# Patient Record
Sex: FEMALE | Race: AMERICAN INDIAN OR ALASKA NATIVE
[De-identification: names, ages, dates, MRNs, and addresses within clinical notes are randomized per-mention and may not be internally consistent; named-entity substitution may affect disease eponyms.]

---

## 2019-11-29 ENCOUNTER — HOSPITAL ENCOUNTER (EMERGENCY)
Dept: HOSPITAL 5 - ED | Age: 29
LOS: 1 days | Discharge: HOME | End: 2019-11-30
Payer: MEDICAID

## 2019-11-29 VITALS — SYSTOLIC BLOOD PRESSURE: 125 MMHG | DIASTOLIC BLOOD PRESSURE: 83 MMHG

## 2019-11-29 DIAGNOSIS — N39.0: Primary | ICD-10-CM

## 2019-11-29 DIAGNOSIS — Z79.01: ICD-10-CM

## 2019-11-29 DIAGNOSIS — Z88.8: ICD-10-CM

## 2019-11-29 DIAGNOSIS — Z86.711: ICD-10-CM

## 2019-11-29 DIAGNOSIS — Z79.899: ICD-10-CM

## 2019-11-29 DIAGNOSIS — Z87.891: ICD-10-CM

## 2019-11-29 DIAGNOSIS — J45.909: ICD-10-CM

## 2019-11-29 LAB
ALBUMIN SERPL-MCNC: 4.6 G/DL (ref 3.9–5)
ALT SERPL-CCNC: 9 UNITS/L (ref 7–56)
BACTERIA #/AREA URNS HPF: (no result) /HPF
BILIRUB UR QL STRIP: (no result)
BLOOD UR QL VISUAL: (no result)
BUN SERPL-MCNC: 11 MG/DL (ref 7–17)
BUN/CREAT SERPL: 16 %
CALCIUM SERPL-MCNC: 9.5 MG/DL (ref 8.4–10.2)
HCT VFR BLD CALC: 43.2 % (ref 30.3–42.9)
HEMOLYSIS INDEX: 25
HGB BLD-MCNC: 14.5 GM/DL (ref 10.1–14.3)
MCHC RBC AUTO-ENTMCNC: 34 % (ref 30–34)
MCV RBC AUTO: 97 FL (ref 79–97)
MUCOUS THREADS #/AREA URNS HPF: (no result) /HPF
PH UR STRIP: 6 [PH] (ref 5–7)
PLATELET # BLD: 223 K/MM3 (ref 140–440)
RBC # BLD AUTO: 4.47 M/MM3 (ref 3.65–5.03)
RBC #/AREA URNS HPF: > 182 /HPF (ref 0–6)
UROBILINOGEN UR-MCNC: < 2 MG/DL (ref ?–2)
WBC #/AREA URNS HPF: > 182 /HPF (ref 0–6)

## 2019-11-29 PROCEDURE — 96372 THER/PROPH/DIAG INJ SC/IM: CPT

## 2019-11-29 PROCEDURE — 96374 THER/PROPH/DIAG INJ IV PUSH: CPT

## 2019-11-29 PROCEDURE — 80307 DRUG TEST PRSMV CHEM ANLYZR: CPT

## 2019-11-29 PROCEDURE — 74177 CT ABD & PELVIS W/CONTRAST: CPT

## 2019-11-29 PROCEDURE — 81001 URINALYSIS AUTO W/SCOPE: CPT

## 2019-11-29 PROCEDURE — 85025 COMPLETE CBC W/AUTO DIFF WBC: CPT

## 2019-11-29 PROCEDURE — 99284 EMERGENCY DEPT VISIT MOD MDM: CPT

## 2019-11-29 PROCEDURE — 96376 TX/PRO/DX INJ SAME DRUG ADON: CPT

## 2019-11-29 PROCEDURE — 96361 HYDRATE IV INFUSION ADD-ON: CPT

## 2019-11-29 PROCEDURE — 81025 URINE PREGNANCY TEST: CPT

## 2019-11-29 PROCEDURE — 36415 COLL VENOUS BLD VENIPUNCTURE: CPT

## 2019-11-29 PROCEDURE — 80053 COMPREHEN METABOLIC PANEL: CPT

## 2019-11-29 PROCEDURE — 96375 TX/PRO/DX INJ NEW DRUG ADDON: CPT

## 2019-11-29 NOTE — EVENT NOTE
ED Screening Note


Date of service: 11/29/19


ED Screening Note: 








This initial assessment/diagnostic orders/clinical plan/treatment(s) is/are 

subject to change based on patients health status, clinical progression and re-

assessment by fellow clinical providers in the ED. Further treatment and workup 

at subsequent clinical providers discretion. Patient/guardian urged not to elope

from the ED as their condition may be serious if not clinically assessed and 

managed. 





Initial orders include: 


28 yo BF states that she has abdominal pain x 1 day.

## 2019-11-29 NOTE — EMERGENCY DEPARTMENT REPORT
<DONALDO MOTT - Last Filed: 11/29/19 23:01>





ED Abdominal Pain HPI





- General


Chief Complaint: Abdominal Pain


Stated Complaint: ABD PAIN


Time Seen by Provider: 11/29/19 19:58


Source: patient


Mode of arrival: Wheelchair


Limitations: No Limitations





- History of Present Illness


Initial Comments: 





Patient is a 29-year-old  female who is presenting with diffuse 

abdominal pain nausea vomiting.  Patient has a history of diverticulitis.  

Patient states that her abdomen started hurting this morning and is diffuse 10 

out 10 pain.  The patient is a poor historian secondary to the pain she is 

having a hard time talking.  She denies dysuria or fevers chills, cold or 

congestion.


Severity scale (0 -10): 10





- Related Data


                                  Previous Rx's











 Medication  Instructions  Recorded  Last Taken  Type


 


Dicyclomine [Bentyl] 20 mg PO QID PRN #20 tablet 04/20/19 Unknown Rx


 


Promethazine [Phenergan TAB] 25 mg PO Q6HR PRN #20 tab 04/20/19 Unknown Rx


 


Promethazine HCl [Promethazine TAB] 12.5 mg PO [id number] 2 Days #8 tab 11/30/19 

Unknown Rx


 


cephALEXin [Keflex] 500 mg PO Q6HR 5 Days #20 capsule 11/30/19 Unknown Rx











                                    Allergies











Allergy/AdvReac Type Severity Reaction Status Date / Time


 


famotidine [From Pepcid] Allergy  Unknown Verified 04/20/19 22:09


 


hydromorphone [From Dilaudid] Allergy  Unknown Verified 04/20/19 22:09














ED Review of Systems


Comment: All other systems reviewed and negative





ED Past Medical Hx





- Past Medical History


Previous Medical History?: Yes


Hx Deep Vein Thrombosis: Yes


Hx Asthma: Yes


Additional medical history: MS.  Diverticulitis





- Surgical History


Past Surgical History?: Yes


Additional Surgical History: D&C, L knee





- Social History


Smoking Status: Former Smoker





- Medications


Home Medications: 


                                Home Medications











 Medication  Instructions  Recorded  Confirmed  Last Taken  Type


 


Dicyclomine [Bentyl] 20 mg PO QID PRN #20 tablet 04/20/19  Unknown Rx


 


Promethazine [Phenergan TAB] 25 mg PO Q6HR PRN #20 tab 04/20/19  Unknown Rx


 


Promethazine HCl [Promethazine TAB] 12.5 mg PO [id number] 2 Days #8 tab 11/30/19  

Unknown Rx


 


cephALEXin [Keflex] 500 mg PO Q6HR 5 Days #20 capsule 11/30/19  Unknown Rx














ED Physical Exam





- General


Limitations: No Limitations


General appearance: alert, anxious, in distress





- Head


Head exam: Present: atraumatic, normocephalic





- Eye


Eye exam: Present: normal appearance, PERRL, EOMI





- ENT


ENT exam: Present: normal orophraynx, mucous membranes moist





- Neck


Neck exam: Present: normal inspection





- Respiratory


Respiratory exam: Present: normal lung sounds bilaterally.  Absent: respiratory 

distress, wheezes, rales, rhonchi





- Cardiovascular


Cardiovascular Exam: Present: regular rate, normal rhythm, normal heart sounds. 

 Absent: systolic murmur, diastolic murmur, rubs, gallop





- GI/Abdominal


GI/Abdominal exam: Present: soft, tenderness, normal bowel sounds.  Absent: 

distended, guarding, rebound, rigid





- Extremities Exam


Extremities exam: Present: normal inspection





- Back Exam


Back exam: Present: normal inspection





- Neurological Exam


Neurological exam: Present: alert, oriented X3





- Psychiatric


Psychiatric exam: Present: normal affect, normal mood





- Skin


Skin exam: Present: warm, dry, intact, normal color.  Absent: rash





ED Medical Decision Making





- Lab Data


Result diagrams: 


                                 11/29/19 19:08





                                 11/29/19 19:11








                                   Lab Results











  11/29/19 11/29/19 Range/Units





  19:08 19:11 


 


WBC  9.6   (4.5-11.0)  K/mm3


 


RBC  4.47   (3.65-5.03)  M/mm3


 


Hgb  14.5 H   (10.1-14.3)  gm/dl


 


Hct  43.2 H   (30.3-42.9)  %


 


MCV  97   (79-97)  fl


 


MCH  32   (28-32)  pg


 


MCHC  34   (30-34)  %


 


RDW  13.8   (13.2-15.2)  %


 


Plt Count  223   (140-440)  K/mm3


 


Lymph % (Auto)  Np   


 


Mono % (Auto)  Np   


 


Eos % (Auto)  Np   


 


Baso % (Auto)  Np   


 


Lymph #  Np   


 


Mono #  Np   


 


Eos #  Np   


 


Baso #  Np   


 


Seg Neutrophils %  Np   


 


Seg Neutrophils #  Np   


 


Sodium   138  (137-145)  mmol/L


 


Potassium   3.5 L  (3.6-5.0)  mmol/L


 


Chloride   101.0  ()  mmol/L


 


Carbon Dioxide   18 L  (22-30)  mmol/L


 


Anion Gap   23  mmol/L


 


BUN   11  (7-17)  mg/dL


 


Creatinine   0.7  (0.7-1.2)  mg/dL


 


Estimated GFR   > 60  ml/min


 


BUN/Creatinine Ratio   16  %


 


Glucose   122 H  ()  mg/dL


 


Calcium   9.5  (8.4-10.2)  mg/dL


 


Total Bilirubin   0.40  (0.1-1.2)  mg/dL


 


AST   16  (5-40)  units/L


 


ALT   9  (7-56)  units/L


 


Alkaline Phosphatase   39  ()  units/L


 


Total Protein   7.8  (6.3-8.2)  g/dL


 


Albumin   4.6  (3.9-5)  g/dL


 


Albumin/Globulin Ratio   1.4  %














ED Disposition


Clinical Impression: 


 Abdominal pain, Urinary tract infection





Disposition: DC-01 TO HOME OR SELFCARE


Condition: Stable


Instructions:  Abdominal Pain (ED), Urinary Tract Infection in Women (ED)


Prescriptions: 


Promethazine HCl [Promethazine TAB] 12.5 mg PO [id number] 2 Days #8 tab


cephALEXin [Keflex] 500 mg PO Q6HR 5 Days #20 capsule


Referrals: 


[facility] [Outside] - 3-5 Days





<DU MOTT - Last Filed: 11/30/19 00:34>





ED Review of Systems


ROS: 


Stated complaint: ABD PAIN


Other details as noted in HPI








ED Course





                                   Vital Signs











  11/29/19 11/29/19 11/29/19





  18:14 18:19 20:00


 


Temperature  97.5 F L 


 


Pulse Rate  57 L 74


 


Respiratory 18 20 27 H





Rate   


 


Blood Pressure  121/94 


 


O2 Sat by Pulse 99 99 





Oximetry   














  11/29/19 11/29/19 11/29/19





  20:16 20:30 20:46


 


Temperature   


 


Pulse Rate 53 L 57 L 55 L


 


Respiratory 56 H 18 19





Rate   


 


Blood Pressure 126/65 126/65 105/54


 


O2 Sat by Pulse 100 100 100





Oximetry   














  11/29/19 11/29/19 11/29/19





  21:00 21:15 21:30


 


Temperature   


 


Pulse Rate 46 L 44 L 47 L


 


Respiratory 18 14 12





Rate   


 


Blood Pressure 105/54 87/66 115/69


 


O2 Sat by Pulse 100 99 100





Oximetry   














  11/29/19 11/29/19 11/29/19





  21:45 22:00 22:15


 


Temperature   


 


Pulse Rate 47 L 50 L 56 L


 


Respiratory 17 16 10 L





Rate   


 


Blood Pressure 122/68 122/68 125/83


 


O2 Sat by Pulse 100  100





Oximetry   














  11/29/19





  22:38


 


Temperature 


 


Pulse Rate 


 


Respiratory 





Rate 


 


Blood Pressure 


 


O2 Sat by Pulse 100





Oximetry 














ED Medical Decision Making





- Lab Data


Result diagrams: 


                                 11/29/19 19:08





                                 11/29/19 19:11





- Radiology Data


Radiology results: report reviewed





CT abdomen and pelvis: No acute findings according to radiologist's impression





- Medical Decision Making





I evaluated Mrs. Crawley.  She has nausea and abdominal discomfort.  She is 

currently laying on her abdomen moving side to side.  She told me that she was 

encouraged to stop using marijuana due to recurrent symptoms of "gastritis".  

She was able to give a full history.





Impression diagnosis includes IBS, cannabis hyperemesis syndrome.





Urinalysis positive for UTI.


I reviewed labs CBC within normal limits.  No leukocytosis.  No anemia.  No 

electrolyte disturbance.








Prescribed cephalexin and promethazine.  Discharged home.


Critical care attestation.: 


If time is entered above; I have spent that time in minutes in the direct care 

of this critically ill patient, excluding procedure time.








ED Disposition


Is pt being admited?: No


Does the pt Need Aspirin: No

## 2019-11-30 LAB
BENZODIAZEPINES SCREEN,URINE: (no result)
METHADONE SCREEN,URINE: (no result)
OPIATE SCREEN,URINE: (no result)

## 2019-11-30 NOTE — CAT SCAN REPORT
CT ABDOMEN AND PELVIS WITH IV CONTRAST



INDICATION:

severe abd pain.



COMPARISON:

None available.



TECHNIQUE:

Axial CT images were obtained through the abdomen and pelvis after 100 mL IV contrast. All CT scans a
t this location are performed using CT dose reduction for ALARA by means of automated exposure contro
l. 



FINDINGS -- ABDOMEN:

Lung Bases: No acute abnormality.

Liver: Normal.

Gallbladder: Normal.  Bile Ducts: Normal.

Pancreas: Normal.

Spleen: Normal.

Adrenals: Normal.

Right Kidney and Proximal Ureter: Normal.

Left Kidney and Proximal Ureter: Normal.

Stomach and Bowel: Normal.

Lymph Nodes: No significant adenopathy.

Aorta: No significant abnormality.  IVC: Normal.

Additional Findings: None.



FINDINGS -- PELVIS:

Urinary Bladder and Distal Ureters: Normal.

Reproductive Organs: No acute abnormality.

Appendix: Normal.  Bowel: No acute abnormality.

Free Fluid: None.

Lymph Nodes: No significant adenopathy.

Additional Findings: None.



Skeletal System: No acute abnormality.



IMPRESSION:

1. No acute process in the abdomen or pelvis.



Signer Name: Tulio Stoddard MD 

Signed: 11/30/2019 12:20 AM

 Workstation Name: [id number]